# Patient Record
Sex: FEMALE | Race: WHITE | URBAN - NONMETROPOLITAN AREA
[De-identification: names, ages, dates, MRNs, and addresses within clinical notes are randomized per-mention and may not be internally consistent; named-entity substitution may affect disease eponyms.]

---

## 2021-01-15 ENCOUNTER — IMPORTED ENCOUNTER (OUTPATIENT)
Dept: URBAN - NONMETROPOLITAN AREA CLINIC 1 | Facility: CLINIC | Age: 56
End: 2021-01-15

## 2021-01-15 PROBLEM — H52.03: Noted: 2021-01-15

## 2021-01-15 PROBLEM — E11.9: Noted: 2021-01-15

## 2021-01-15 PROBLEM — H25.813: Noted: 2021-01-15

## 2021-01-15 PROBLEM — H52.4: Noted: 2021-01-15

## 2021-01-15 PROCEDURE — 92014 COMPRE OPH EXAM EST PT 1/>: CPT

## 2021-01-15 NOTE — PATIENT DISCUSSION
Hyperopia/ Presbyopia OU Discussed dx w/ patient Explained to patient I do not do hyperopic lasik. Explained to patient effects of having hyperopic lasik when it comes time to have cataract surgery and creates more difficulties having special implant to correct asgtimatism d/t hyperopic lasik creating irregular surgical astigmatism. Patient expresses understanding. Do not recommend lasik and recommend hold off she agrees. MR done today GLS RX given. Ok to use OTC readers if she desires. Continue to monitor Combinded Cataracts OU-Not yet surgical. -Reviewed symptoms of advancing cataract growth such as glare and halos and decreased vision.-Continue to monitor for now. Pt will notify us if any new symptoms develop.-Dr Patient to follow up w/ Dr Awilda Marinelli yearly and when she feels she has lifestyle complaints to let him know.  DM Discussed dx in relation to vision and she understands No BDR noted OU today Stressed importance of maintaining good blood sugar control No treatment needed stable.; Dr's Notes: MR 1/15/21DFE 1/15/21

## 2022-04-09 ASSESSMENT — VISUAL ACUITY
OS_CC: 20/30+2
OS_SC: J16
OD_PH: 20/25
OD_SC: J16
OS_PH: 20/20
OS_GLARE: 20/25
OD_CC: 20/50
OS_AM: 20/20
OD_PAM: 20/20
OD_GLARE: 20/50

## 2022-04-09 ASSESSMENT — TONOMETRY
OS_IOP_MMHG: 17
OD_IOP_MMHG: 17

## 2023-07-12 ENCOUNTER — CONSULTATION/EVALUATION (OUTPATIENT)
Dept: RURAL CLINIC 1 | Facility: CLINIC | Age: 58
End: 2023-07-12

## 2023-07-12 DIAGNOSIS — H25.813: ICD-10-CM

## 2023-07-12 PROCEDURE — 99214 OFFICE O/P EST MOD 30 MIN: CPT

## 2023-07-12 PROCEDURE — 92134 CPTRZ OPH DX IMG PST SGM RTA: CPT

## 2023-07-12 ASSESSMENT — VISUAL ACUITY
OU_CC: 20/30
OS_CC: 20/30
OD_BAT: 20/40
OD_PH: 20/30
OD_CC: 20/50
OU_SC: 20/40
OD_CC: 20/200
OS_AM: 20/25
OS_PH: 20/30-1
OS_SC: 20/40
OS_CC: 20/100
OU_CC: 20/200
OD_PAM: 20/30
OD_SC: 20/50-1
OS_BAT: 20/50

## 2023-07-12 ASSESSMENT — TONOMETRY
OS_IOP_MMHG: 14
OD_IOP_MMHG: 14

## 2023-07-25 ENCOUNTER — FOLLOW UP (OUTPATIENT)
Dept: RURAL CLINIC 1 | Facility: CLINIC | Age: 58
End: 2023-07-25

## 2023-07-25 DIAGNOSIS — H25.13: ICD-10-CM

## 2023-07-25 DIAGNOSIS — E11.9: ICD-10-CM

## 2023-07-25 PROCEDURE — 99214 OFFICE O/P EST MOD 30 MIN: CPT

## 2023-07-25 ASSESSMENT — VISUAL ACUITY
OS_SC: 20/60
OU_SC: 20/50-1
OD_SC: 20/200

## 2023-07-25 ASSESSMENT — TONOMETRY
OD_IOP_MMHG: 14
OS_IOP_MMHG: 14